# Patient Record
Sex: MALE | Race: WHITE | URBAN - METROPOLITAN AREA
[De-identification: names, ages, dates, MRNs, and addresses within clinical notes are randomized per-mention and may not be internally consistent; named-entity substitution may affect disease eponyms.]

---

## 2020-03-07 ENCOUNTER — OFFICE VISIT (OUTPATIENT)
Dept: FAMILY MEDICINE CLINIC | Age: 60
End: 2020-03-07
Payer: COMMERCIAL

## 2020-03-07 VITALS
SYSTOLIC BLOOD PRESSURE: 103 MMHG | HEIGHT: 70 IN | WEIGHT: 171 LBS | DIASTOLIC BLOOD PRESSURE: 67 MMHG | TEMPERATURE: 98.7 F | BODY MASS INDEX: 24.48 KG/M2 | OXYGEN SATURATION: 89 % | HEART RATE: 112 BPM

## 2020-03-07 PROCEDURE — 99201 PR OFFICE OUTPATIENT NEW 10 MINUTES: CPT | Performed by: NURSE PRACTITIONER

## 2020-03-07 PROCEDURE — 94640 AIRWAY INHALATION TREATMENT: CPT | Performed by: NURSE PRACTITIONER

## 2020-03-07 RX ORDER — ALBUTEROL SULFATE 90 UG/1
2 AEROSOL, METERED RESPIRATORY (INHALATION) EVERY 6 HOURS PRN
Qty: 1 INHALER | Refills: 0 | Status: SHIPPED | OUTPATIENT
Start: 2020-03-07

## 2020-03-07 RX ORDER — PREDNISONE 20 MG/1
40 TABLET ORAL DAILY
Qty: 10 TABLET | Refills: 0 | Status: SHIPPED | OUTPATIENT
Start: 2020-03-07 | End: 2020-03-12

## 2020-03-07 RX ORDER — AZITHROMYCIN 250 MG/1
250 TABLET, FILM COATED ORAL SEE ADMIN INSTRUCTIONS
Qty: 6 TABLET | Refills: 0 | Status: SHIPPED | OUTPATIENT
Start: 2020-03-07 | End: 2020-03-12

## 2020-03-07 RX ORDER — ALBUTEROL SULFATE 2.5 MG/3ML
2.5 SOLUTION RESPIRATORY (INHALATION) ONCE
Status: COMPLETED | OUTPATIENT
Start: 2020-03-07 | End: 2020-03-07

## 2020-03-07 RX ADMIN — ALBUTEROL SULFATE 2.5 MG: 2.5 SOLUTION RESPIRATORY (INHALATION) at 11:04

## 2020-03-07 ASSESSMENT — ENCOUNTER SYMPTOMS
CHEST TIGHTNESS: 1
RHINORRHEA: 1
WHEEZING: 1
SINUS PAIN: 0
SORE THROAT: 0
SHORTNESS OF BREATH: 1
SINUS PRESSURE: 0
COUGH: 1

## 2020-03-07 NOTE — PROGRESS NOTES
[address]  [address] [place] [address]  Dept: [phone]  Dept Fax: [phone]    Cindi Ogden is a 62 y.o. male who presents to the urgent care today for his medicalconditions/complaints as noted below. Cindi Ogden is c/o of Shortness of Breath (pt is here for a cough and sob and fever. )      HPI:       61-year-old male patient presents with complaint of cough, congestion. Patient had onset of symptoms over the past 5 days. Patient describes gradual worsening. Describes cough harsh, productive of thick sputum. Additionally he has shortness of breath, chest tightness, wheezing. Patient has a known history of smoking for approximately 30 years. Patient denies any official diagnosis of asthma or COPD. Additional symptoms include fevers, chills, nasal congestion, body aches. Denies sinus pain, sore throat, ear pain. Relieving factors include none. Risk factors include none. Treatments tried include none. Shakila Hazard History reviewed. No pertinent past medical history. Current Outpatient Medications   Medication Sig Dispense Refill    azithromycin (ZITHROMAX) 250 MG tablet Take 1 tablet by mouth See Admin Instructions for 5 days 500mg on day 1 followed by 250mg on days 2 - 5 6 tablet 0    predniSONE (DELTASONE) 20 MG tablet Take 2 tablets by mouth daily for 5 days 10 tablet 0    albuterol sulfate  (90 Base) MCG/ACT inhaler Inhale 2 puffs into the lungs every 6 hours as needed for Wheezing 1 Inhaler 0     No current facility-administered medications for this visit. No Known Allergies    Subjective:      Review of Systems   Constitutional: Positive for chills and fever. HENT: Positive for congestion and rhinorrhea. Negative for ear pain, sinus pressure, sinus pain and sore throat. Respiratory: Positive for cough (clear), chest tightness, shortness of breath and wheezing. Cardiovascular: Negative for chest pain. - 5     Dispense:  6 tablet     Refill:  0    predniSONE (DELTASONE) 20 MG tablet     Sig: Take 2 tablets by mouth daily for 5 days     Dispense:  10 tablet     Refill:  0    albuterol sulfate  (90 Base) MCG/ACT inhaler     Sig: Inhale 2 puffs into the lungs every 6 hours as needed for Wheezing     Dispense:  1 Inhaler     Refill:  0    albuterol (PROVENTIL) nebulizer solution 2.5 mg     Administrations This Visit     albuterol (PROVENTIL) nebulizer solution 2.5 mg     Admin Date  03/07/2020 Action  Given Dose  2.5 mg Route  Nebulization Administered By  JOHN Ma CNP              Patient patient's oxygen status at presentation it was recommended that he go to the emergency room for emergent evaluation of cough congestion, patient declined    We will treat as acute URI with oral antibiotic, oral steroid, inhaler. Patient did have improvement following use of nebulizer solution. Order for chest x-ray to complete urgently. Discussed to follow up here or at an ER if sx worsen or persist.  Pt agreeable to plan. Patient given educational materials - see patient instructions. Discussed use, benefit, and side effects of prescribed medications. All patientquestions answered. Pt voiced understanding. This note was transcribed using dictation with Dragon services. Efforts were made to correct any errors but some words may be misinterpreted.      Electronically signed by JOHN Ma CNP on 3/7/2020at 11:04 AM

## 2020-03-07 NOTE — PATIENT INSTRUCTIONS
notice more mucus or a change in the color of your mucus.     · You have new symptoms, such as a sore throat, an earache, or sinus pain.     · You do not get better as expected. Where can you learn more? Go to [email or web address] and sign in to your [employer] account. Enter D279 in the [employer] box to learn more about \"Cough: Care Instructions. \"     If you do not have an account, please click on the \"Sign Up Now\" link. Current as of: June 9, 2019  Content Version: 12.3  © 1323-3263 [employer]. Care instructions adapted under license by [facility] [place] ([facility]). If you have questions about a medical condition or this instruction, always ask your healthcare professional. [address] any warranty or liability for your use of this information.